# Patient Record
(demographics unavailable — no encounter records)

---

## 2024-10-14 NOTE — HISTORY OF PRESENT ILLNESS
[FreeTextEntry1] : This 8-year-old returns for follow-up status post infection left hip no complaints noted no fever he has not been complaining his lab test from October 7 continues to trend down sed rate 46 CRP 5.9

## 2024-10-14 NOTE — PHYSICAL EXAM
[FreeTextEntry1] : His exam reveals no significant limp he has good motion to the hip no irritability no swelling wound is clean and dry.  No tenderness about the hip girdle.  X-rays ordered and taken today of the left hip are satisfactory

## 2024-10-14 NOTE — ASSESSMENT
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  Will continue with his antibiotics.  No playground gym as yet.  I will see him in 2-1/2 weeks and 2 weeks he will have a repeat CBC ESR and CRP

## 2024-10-30 NOTE — PHYSICAL EXAM
[FreeTextEntry1] : On exam he is walking nicely no significant limp he has good motion to the left hip no swelling no tenderness strength is progressively improving to the hip girdle and lower extremity.

## 2024-10-30 NOTE — HISTORY OF PRESENT ILLNESS
[FreeTextEntry1] : This 8-year-old returns for follow-up of his left hip he is doing very well no complaints of pain no swelling no fever.  His recent blood test revealed a CRP of 1.9 minimally elevated sed rate of 20 normal white count.

## 2024-10-30 NOTE — ASSESSMENT
[FreeTextEntry1] : Impression: Infection left hip girdle.  Is doing quite well continue with restricted activities I will see him in 3 weeks to have blood test CBC CRP and ESR within a few days of that appointment

## 2024-12-16 NOTE — PHYSICAL EXAM
[FreeTextEntry1] : On examination he runs and walks without any posturing or limp he has good strength to the left hip girdle with full motion to the hip no tenderness whatsoever.  X-rays ordered and taken today are satisfactory with no evidence of bone reaction

## 2024-12-16 NOTE — HISTORY OF PRESENT ILLNESS
[FreeTextEntry1] : This 8-year-old returns for follow-up of his left hip doing very well no complaints whatsoever

## 2025-03-18 NOTE — ASSESSMENT
[FreeTextEntry1] : Impression: Status post osteomyelitis left hip/hemipelvis.  He is unrestricted and activities he will return in 6 months with x-rays of the left hip

## 2025-03-18 NOTE — HISTORY OF PRESENT ILLNESS
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  He is doing well no complaints noted

## 2025-03-18 NOTE — PHYSICAL EXAM
[FreeTextEntry1] : Examination today he has a normal gait he runs without difficulty no posturing.  Excellent motion to the left hip excellent strength of the hip girdle no points of tenderness.  X-rays ordered and taken today left hip are normal